# Patient Record
Sex: MALE | Race: WHITE | Employment: UNEMPLOYED | URBAN - METROPOLITAN AREA
[De-identification: names, ages, dates, MRNs, and addresses within clinical notes are randomized per-mention and may not be internally consistent; named-entity substitution may affect disease eponyms.]

---

## 2017-07-08 ENCOUNTER — APPOINTMENT (OUTPATIENT)
Dept: GENERAL RADIOLOGY | Age: 57
End: 2017-07-08
Attending: NURSE PRACTITIONER
Payer: SELF-PAY

## 2017-07-08 ENCOUNTER — HOSPITAL ENCOUNTER (EMERGENCY)
Age: 57
Discharge: HOME | End: 2017-07-09
Attending: EMERGENCY MEDICINE
Payer: SELF-PAY

## 2017-07-08 DIAGNOSIS — M62.838 MUSCLE SPASMS OF NECK: Primary | ICD-10-CM

## 2017-07-08 DIAGNOSIS — M79.604 RIGHT LEG PAIN: ICD-10-CM

## 2017-07-08 PROCEDURE — 73610 X-RAY EXAM OF ANKLE: CPT

## 2017-07-08 PROCEDURE — 99283 EMERGENCY DEPT VISIT LOW MDM: CPT

## 2017-07-08 PROCEDURE — 72050 X-RAY EXAM NECK SPINE 4/5VWS: CPT

## 2017-07-08 PROCEDURE — 74011250637 HC RX REV CODE- 250/637: Performed by: NURSE PRACTITIONER

## 2017-07-08 RX ORDER — TRAMADOL HYDROCHLORIDE 50 MG/1
50 TABLET ORAL
Status: COMPLETED | OUTPATIENT
Start: 2017-07-08 | End: 2017-07-08

## 2017-07-08 RX ADMIN — TRAMADOL HYDROCHLORIDE 50 MG: 50 TABLET, FILM COATED ORAL at 23:49

## 2017-07-09 VITALS
TEMPERATURE: 98.1 F | BODY MASS INDEX: 34.15 KG/M2 | RESPIRATION RATE: 18 BRPM | WEIGHT: 200 LBS | OXYGEN SATURATION: 98 % | HEIGHT: 64 IN | SYSTOLIC BLOOD PRESSURE: 140 MMHG | DIASTOLIC BLOOD PRESSURE: 85 MMHG | HEART RATE: 70 BPM

## 2017-07-09 RX ORDER — METHOCARBAMOL 500 MG/1
500 TABLET, FILM COATED ORAL 4 TIMES DAILY
Qty: 10 TAB | Refills: 0 | Status: SHIPPED | OUTPATIENT
Start: 2017-07-09 | End: 2017-07-09

## 2017-07-09 RX ORDER — METHOCARBAMOL 500 MG/1
500 TABLET, FILM COATED ORAL 4 TIMES DAILY
Qty: 10 TAB | Refills: 0 | Status: SHIPPED | OUTPATIENT
Start: 2017-07-09

## 2017-07-09 RX ORDER — PREDNISONE 10 MG/1
TABLET ORAL
Qty: 21 TAB | Refills: 0 | Status: SHIPPED | OUTPATIENT
Start: 2017-07-09 | End: 2017-07-09

## 2017-07-09 RX ORDER — TRAMADOL HYDROCHLORIDE 50 MG/1
50 TABLET ORAL
Qty: 20 TAB | Refills: 0 | Status: SHIPPED | OUTPATIENT
Start: 2017-07-09

## 2017-07-09 RX ORDER — PREDNISONE 10 MG/1
TABLET ORAL
Qty: 21 TAB | Refills: 0 | Status: SHIPPED | OUTPATIENT
Start: 2017-07-09

## 2017-07-09 NOTE — ED PROVIDER NOTES
HPI Comments:   Patient is a 71-year-old  male arrives via private vehicle with complaints of difficulty walking in his right lower extremity due to pain and right neck and shoulder pain. Right neck and shoulder: Pain started this morning progressively has worsened. No radiation no numbness no tingling. No loss of strength. No weakness. Nothing makes it better nothing makes it worse. Right lower extremity: Acute on chronic pain. States pain is worse when he first gets out of bed in the morning and after resting for prolonged periods. He states after he \"gets loosened up he feels much better\". The pain is particularly bad today and is complaining about sharp shooting pains from the heel up in 2 the tibial area. Medications: None  Allergies: Percocet causes nausea    Patient is a 65 y.o. male presenting with leg pain and neck pain. The history is provided by the patient. The history is limited by a language barrier. Leg Pain    This is a recurrent problem. The pain is present in the right lower leg. The quality of the pain is described as sharp. The pain is at a severity of 8/10. The pain is severe. Associated symptoms include neck pain. Pertinent negatives include no numbness, full range of motion, no tingling, no itching and no back pain. The symptoms are aggravated by movement. He has tried nothing for the symptoms. History of extremity trauma: remote trauma. Neck Pain    This is a new problem. The current episode started 12 to 24 hours ago. The problem occurs constantly. The problem has been gradually worsening. The pain is associated with nothing. There has been no fever. The pain is present in the right side. The quality of the pain is described as stabbing and shooting. The pain radiates to the right shoulder. The pain is at a severity of 8/10. The symptoms are aggravated by certain positions. Associated symptoms include leg pain.  Pertinent negatives include no chest pain, no syncope, no numbness, no weight loss, no headaches, no bowel incontinence, no bladder incontinence, no paresis, no tingling and no weakness. Past Medical History:   Diagnosis Date    Hepatitis     Infectious disease     Possible hepatitis, unknown what kind, tested positive in alf       Past Surgical History:   Procedure Laterality Date    HX ORTHOPAEDIC      right leg surgery         Family History:   Problem Relation Age of Onset    Diabetes Mother     Cancer Mother      lung cancer    Heart Disease Father     Diabetes Paternal Grandmother        Social History     Social History    Marital status: UNKNOWN     Spouse name: N/A    Number of children: N/A    Years of education: N/A     Occupational History    Not on file. Social History Main Topics    Smoking status: Current Every Day Smoker     Packs/day: 1.00     Years: 30.00    Smokeless tobacco: Never Used    Alcohol use Yes      Comment: 3 per month    Drug use: No    Sexual activity: Not Currently     Other Topics Concern    Not on file     Social History Narrative    No narrative on file         ALLERGIES: Percocet [oxycodone-acetaminophen]    Review of Systems   Constitutional: Positive for activity change. Negative for weight loss. HENT: Negative. Eyes: Negative. Respiratory: Negative. Cardiovascular: Negative for chest pain and syncope. Gastrointestinal: Negative. Negative for bowel incontinence. Endocrine: Negative. Genitourinary: Negative. Negative for bladder incontinence. Musculoskeletal: Positive for arthralgias, gait problem, joint swelling, neck pain and neck stiffness. Negative for back pain. Skin: Negative. Negative for itching. Allergic/Immunologic: Negative. Neurological: Negative for tingling, weakness, numbness and headaches. Hematological: Negative. Psychiatric/Behavioral: Negative.         Vitals:    07/08/17 2311   BP: (!) 139/93   Pulse: 75   Resp: 20   Temp: 98.1 °F (36.7 °C)   SpO2: 98%   Weight: 90.7 kg (200 lb)   Height: 5' 4\" (1.626 m)            Physical Exam   Constitutional: He is oriented to person, place, and time. Vital signs are normal. He appears well-developed and well-nourished. HENT:   Head: Normocephalic and atraumatic. Cardiovascular: Normal rate. Pulmonary/Chest: Effort normal and breath sounds normal.   Abdominal: Soft. Musculoskeletal: He exhibits tenderness. He exhibits no edema or deformity. Cervical back: He exhibits decreased range of motion, tenderness, pain and spasm. He exhibits no bony tenderness, no swelling, no edema, no deformity and no laceration. Right foot: There is decreased range of motion, tenderness, bony tenderness and swelling. There is normal capillary refill, no crepitus, no deformity and no laceration. Right lateral neck: muscle spasm with TTP    Right lower extremity: plantar pain with neuropathic-like pain radiating up leg with each step   Neurological: He is alert and oriented to person, place, and time. Coordination normal.   Skin: Skin is warm and dry. Psychiatric: He has a normal mood and affect. His behavior is normal. Judgment and thought content normal.   Nursing note and vitals reviewed. MDM  Number of Diagnoses or Management Options  Muscle spasms of neck:   Right leg pain:   Diagnosis management comments: Assessment & Plan:   Right ankle films  C-spine films for Neck spasm    Discussed with Dr. Severo Israel, MD    Films negative  Pred pack for muscle inflammation of neck  Robaxin prn muscle spasm  Ultram: prn pain    Patient's results have been reviewed with them. Patient and/or family have verbally conveyed their understanding and agreement of the patient's signs, symptoms, diagnosis, treatment and prognosis and additionally agree to follow up as recommended or return to the Emergency Room should their condition change prior to follow-up.  Discharge instructions have also been provided to the patient with some educational information regarding their diagnosis as well a list of reasons why they would want to return to the ER prior to their follow-up appointment should their condition change.     Shira Arredondo NP  07/09/17  1:08 AM           Amount and/or Complexity of Data Reviewed  Tests in the radiology section of CPT®: ordered      ED Course       Procedures

## 2017-07-09 NOTE — ED TRIAGE NOTES
Patient with prev leg sx having shooting pain in the right leg. States has been getting progressively worse. Also having neck pain starting today. Denies any injury.

## 2020-01-28 ENCOUNTER — HOSPITAL ENCOUNTER (EMERGENCY)
Age: 60
Discharge: LEFT BEFORE DISCHARGE | End: 2020-01-28
Attending: EMERGENCY MEDICINE
Payer: SELF-PAY

## 2020-01-28 PROCEDURE — 75810000275 HC EMERGENCY DEPT VISIT NO LEVEL OF CARE

## 2023-02-27 NOTE — DISCHARGE INSTRUCTIONS
We hope that we have addressed all of your medical concerns. The examination and treatment you received in the Emergency Department were for an emergent problem and were not intended as complete care. It is important that you follow up with your healthcare provider(s) for ongoing care. If your symptoms worsen or do not improve as expected, and you are unable to reach your usual health care provider(s), you should return to the Emergency Department. Today's healthcare is undergoing tremendous change, and patient satisfaction surveys are one of the many tools to assess the quality of medical care. You may receive a survey from the [employer] regarding your experience in the Emergency Department. I hope that your experience has been completely positive, particularly the medical care that I provided. As such, please participate in the survey; anything less than excellent does not meet my expectations or intentions. [address] [facility] and [address] [place] [address] participate in nationally recognized quality of care measures. If your blood pressure is greater than 120/80, as reported below, we urge that you seek medical care to address the potential of high blood pressure, commonly known as hypertension. Hypertension can be hereditary or can be caused by certain medical conditions, pain, stress, or \"white coat syndrome. \"       Please make an appointment with your health care provider(s) for follow up of your Emergency Department visit. VITALS:   Patient Vitals for the past 8 hrs:   Temp Pulse Resp BP SpO2   07/08/17 2311 98.1 °F (36.7 °C) 75 20 (!) 139/93 98 %          Thank you for allowing us to provide you with medical care today. We realize that you have many choices for your emergency care needs. Please choose us in the future for any continued health care needs. Regards,           Alondra Dean, [address] [place] [address] [address] Office: [phone]            No results found for this or any previous visit (from the past 24 hour(s)). Xr Spine Cerv 4 Or 5 V    Result Date: 7/9/2017  EXAM:  XR SPINE CERV 4 OR 5 V INDICATION: Neck pain started today. No injury. COMPARISON: None. TECHNIQUE: 6 images of 5 views cervical spine. FINDINGS: The skull base through C6 is imaged. C7 and T1 are not well imaged. No evidence of acute fracture or compression deformity. The prevertebral soft tissues are within normal limits. Degenerative disc disease is mild at C4-5. Foramina are patent. The C1-C2 relationship is within normal limits. IMPRESSION:  1. No evidence of fracture from the skull base through C6. 2. Mild degenerative disc disease at C4-C5. Xr Ankle Rt Min 3 V    Result Date: 7/9/2017  EXAM:  XR ANKLE RT MIN 3 V INDICATION:  Increasing right leg pain without recent injury. Previous right lower extremity surgery. COMPARISON: None available in the [employer] system. FINDINGS: Three views of the right ankle demonstrate partially imaged tibial intramedullary nora. Calcaneus screw is in good position. No evidence of hardware complication. There is no acute fracture or dislocation. 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 Old, healed fibular fracture. Moderate tibiotalar joint osteoarthritis. Mild subtalar and talonavicular joint osteoarthritis. No erosion. Nonspecific mild lateral soft tissue swelling. IMPRESSION: 1. No acute fracture. 2. Surgical hardware without complication. 3. Osteoarthritis. Neck Spasm: Care Instructions  Your Care Instructions  A neck spasm is sudden tightness and pain in your neck muscles. A spasm may be caused by some activities or repeated movements. For example, you may be more likely to have a neck spasm if you slouch, paint a ceiling, work at a computer, or sleep with your neck twisted. But the cause isn't always clear.   Home treatment includes using heat or ice, taking over-the-counter (OTC) pain medicines, and avoiding activities that may lead to neck pain. Gentle stretching, or treatments such as massage or manipulation, may also help ease a neck spasm. For a neck spasm that doesn't get better with home care, your doctor may prescribe medicine. He or she may also suggest exercise or physical therapy to help strengthen or relax your neck muscles. Follow-up care is a key part of your treatment and safety. Be sure to make and go to all appointments, and call your doctor if you are having problems. It's also a good idea to know your test results and keep a list of the medicines you take. How can you care for yourself at home? · To relieve pain, use heat or ice (whichever feels better) on the affected area. ¨ Put a warm water bottle, a heating pad set on low, or a warm cloth on your neck. Put a thin cloth between the heating pad and your skin. Do not go to sleep with a heating pad on your skin. ¨ Try ice or a cold pack on the area for 10 to 20 minutes at a time. Put a thin cloth between the ice and your skin. · Ask your doctor if you can take acetaminophen (such as Tylenol) or nonsteroidal anti-inflammatory drugs, such as ibuprofen or naproxen. Your doctor can prescribe stronger medicines if needed. Be safe with medicines. Read and follow all instructions on the label. · Stretch your muscles every day, especially before and after exercise and at bedtime. Regular stretching can help relax your muscles. · Try to find a pillow and a position in bed that help improve your night's rest.  · Try to stay active. It's best to start activity slowly. If an exercise makes your pain worse, stop doing it. When should you call for help? Call 911 anytime you think you may need emergency care. For example, call if:  · You are unable to move an arm or a leg at all.   Call your doctor now or seek immediate medical care if:  · You have new or worse symptoms in your arms, legs, belly, or buttocks. Symptoms may include:  ¨ Numbness or tingling. ¨ Weakness. ¨ Pain. · You lose bladder or bowel control. Watch closely for changes in your health, and be sure to contact your doctor if:  · You do not get better as expected. Where can you learn more? Go to http://eladio-mary.info/. Enter C450 in the search box to learn more about \"Neck Spasm: Care Instructions. \"  Current as of: March 21, 2017  Content Version: 11.3  © 1753-4802 [employer]. Care instructions adapted under license by [employer] (which disclaims liability or warranty for this information). If you have questions about a medical condition or this instruction, always ask your healthcare professional. [address] any warranty or liability for your use of this information. Detail Level: Detailed